# Patient Record
Sex: FEMALE | Race: WHITE | URBAN - METROPOLITAN AREA
[De-identification: names, ages, dates, MRNs, and addresses within clinical notes are randomized per-mention and may not be internally consistent; named-entity substitution may affect disease eponyms.]

---

## 2023-07-10 NOTE — PATIENT INSTRUCTIONS
Ob/gyn  - Dr. Kenji Storey - operates out of [facility] Dr. Adolfo Blackwood and Zena - operates out of [facility]

## 2023-07-24 NOTE — TELEPHONE ENCOUNTER
From: Jojo Velasquez  To: Sarath Beckman MD  Sent: 7/24/2023 9:34 AM CDT  Subject: Question regarding COMP METABOLIC PANEL (14)    Good Morning, can I get an explanation on what the results mean? One of them seem to be over the normal range/high, what does that mean and should I worry?

## 2024-05-02 ENCOUNTER — OFFICE VISIT (OUTPATIENT)
Dept: OBGYN CLINIC | Facility: CLINIC | Age: 39
End: 2024-05-02

## 2024-05-02 VITALS
SYSTOLIC BLOOD PRESSURE: 120 MMHG | BODY MASS INDEX: 30.1 KG/M2 | DIASTOLIC BLOOD PRESSURE: 72 MMHG | WEIGHT: 163.56 LBS | HEIGHT: 61.81 IN

## 2024-05-02 DIAGNOSIS — Z12.4 ENCOUNTER FOR PAPANICOLAOU SMEAR FOR CERVICAL CANCER SCREENING: ICD-10-CM

## 2024-05-02 DIAGNOSIS — Z30.432 ENCOUNTER FOR REMOVAL OF INTRAUTERINE CONTRACEPTIVE DEVICE: ICD-10-CM

## 2024-05-02 DIAGNOSIS — N76.0 VAGINITIS AND VULVOVAGINITIS: ICD-10-CM

## 2024-05-02 DIAGNOSIS — Z01.419 ENCOUNTER FOR WELL WOMAN EXAM WITH ROUTINE GYNECOLOGICAL EXAM: Primary | ICD-10-CM

## 2024-05-02 DIAGNOSIS — Z30.09 FAMILY PLANNING ADVICE: ICD-10-CM

## 2024-05-02 PROCEDURE — 99385 PREV VISIT NEW AGE 18-39: CPT | Performed by: OBSTETRICS & GYNECOLOGY

## 2024-05-02 PROCEDURE — 58301 REMOVE INTRAUTERINE DEVICE: CPT | Performed by: OBSTETRICS & GYNECOLOGY

## 2024-05-02 RX ORDER — NORGESTIMATE AND ETHINYL ESTRADIOL 0.25-0.035
1 KIT ORAL DAILY
Qty: 84 TABLET | Refills: 4 | Status: SHIPPED | OUTPATIENT
Start: 2024-05-02

## 2024-05-02 NOTE — PROGRESS NOTES
[facility]  Obstetrics and Gynecology  Gynecology Visit    Chief Complaint   Patient presents with    Annual           Patsy Poe is a 39 year old female who presents for annual exam.    LMP: IUD.    Menses regular: n/a.    Menstrual flow normal: n/a.    Birth control or HRT:  Mirena.     Last Pap Smear: 2022  Any history of abnormal paps: positive HPV, negative for 16,18,45.   Last MMG: n/a  Any Medication Refills needed today?: no  Sleep: 7-8 hours.    Diet: balanced.    Exercise: occasional.   Screening labs/Blood work today: no.     Colonoscopy (if over 44 y/o): n/a.   Gardasil:(age 9-44 y/o) n/a.   Genetic Cancer screen (if indicated): no.   Flu (Aug-April): n/a.TDAP (every 10 years) up to date.      Additional Problems/concerns: Patient would like IUD removed.      Next Appt: annual scheduled    Immunization History   Administered Date(s) Administered    TDAP 07/10/2023       No current outpatient medications on file.    No Known Allergies    OB History    Para Term  AB Living   2 2 2 0 0 2   SAB IAB Ectopic Multiple Live Births   0 0 0 0 2      # Outcome Date GA Lbr Yash/2nd Weight Sex Type Anes PTL Lv   2 Term 09     NORMAL SPONT   KENNEDY   1 Term 07     NORMAL SPONT   KENNEDY       No past medical history on file.    No past surgical history on file.    Family History   Problem Relation Age of Onset    Ovarian Cancer Maternal Grandmother     High Blood Pressure Father     Diabetes Father     High Cholesterol Father         Tobacco         Social History     Socioeconomic History    Marital status:      Spouse name: Not on file    Number of children: Not on file    Years of education: Not on file    Highest education level: Not on file   Occupational History    Not on file   Tobacco Use    Smoking status: Some Days     Passive exposure: Never    Smokeless tobacco: Never    Tobacco comments:     vaping   Vaping Use    Vaping status: Not on file   Substance and Sexual  Activity    Alcohol use: Yes     Comment: occ    Drug use: Never    Sexual activity: Yes     Birth control/protection: I.U.D.     Comment: in a relationship   Other Topics Concern    Not on file   Social History Narrative    Not on file     Social Determinants of Health     Financial Resource Strain: Not on file   Food Insecurity: Not on file   Transportation Needs: Not on file   Physical Activity: Not on file   Stress: Not on file   Social Connections: Not on file   Housing Stability: Not on file         /72   Ht 5' 1.81\" (1.57 m)   Wt 163 lb 9.3 oz (74.2 kg)   LMP 07/10/2019 (Exact Date)   BMI 30.10 kg/m²     Wt Readings from Last 3 Encounters:   05/02/24 163 lb 9.3 oz (74.2 kg)   07/10/23 162 lb 3.2 oz (73.6 kg)   03/01/22 158 lb (71.7 kg)         Health Maintenance   Topic Date Due    Influenza Vaccine (1) 08/01/2021    Screen for Cervical Cancer 11/05/2021    DTaP,Tdap and Td Vaccines (3 - Td or Tdap) 03/18/2025    Hepatitis C Screening Completed    HIV Screening Completed    COVID-19 Vaccine Completed     Review of Systems   General: Present- Feeling well. Not Present- Chills, Fever, Weight Gain and Weight Loss.  HEENT: Not Present- Headache and Sore Throat.  Respiratory: Not Present- Cough, Difficulty Breathing, Hemoptysis and Sputum Production.  Cardiovascular: Not Present- Chest Pain, Elevated Blood Pressure, Fainting / Blacking Out and Shortness of Breath.  Gastrointestinal: Not Present- Constipation, Diarrhea, Nausea and Vomiting.  Female Genitourinary: Not Present- Discharge, Dysuria and Frequency.  Musculoskeletal: Not Present- Leg Cramps and Swelling of Extremities.  Neurological: Not Present- Dizziness and Headaches.  Psychiatric: Not Present- Anxiety and Depression.  Endocrine: Not Present- Appetite Changes, Hair Changes and Thyroid Problems.  Hematology: Not Present- Easy Bruising and Excessive bleeding.  All other systems negative     Physical Exam The physical exam findings are as  follows:     General   Mental Status - Alert. General Appearance - Cooperative. Orientation - Oriented X4. Build & Nutrition - Well nourished.    Head and Neck  Thyroid   Gland Characteristics - normal size and consistency.    Chest and Lung Exam   Inspection:   Chest Wall: - Normal.  Percussion:   Quality and Intensity: - Percussion normal.  Palpation: - Palpation normal.  Auscultation:   Breath sounds: - Normal.  Adventitious sounds: - No Adventitious sounds.    Breast   Nipples: Characteristics - Bilateral - Normal. Discharge - Bilateral - None.  Breast - Bilateral - Symmetric.    Cardiovascular   Auscultation: Rhythm - Regular. Heart Sounds - Normal heart sounds.  Murmurs & Other Heart Sounds: Auscultation of the heart reveals - No Murmurs.    Abdomen   Inspection: Inspection of the abdomen reveals - No Hernias. Incisional scars - no incisional scars.  Palpation/Percussion: Palpation and Percussion of the abdomen reveal - Non Tender and No Palpable abdominal masses.  Liver: - Normal.  Auscultation: Auscultation of the abdomen reveals - Bowel sounds normal.    Female Genitourinary     External Genitalia   Perineum - Normal. Bartholin's Gland - Bilateral - Normal. Clitoris - Normal.  Introitus: Characteristics - No Cystocele, Enterocele or Rectocele. Discharge - None.  Labia Majora: Lesions - Bilateral - None. Characteristics - Bilateral - Normal.  Labia Minora: Lesions - Bilateral - None. Characteristics - Bilateral - Normal.  Urethra: Characteristics - Normal. Discharge - None.  [place] Gland - Bilateral - Normal.  Vulva: Characteristics - Normal. Lesions - None.    Speculum & Bimanual   Vagina:   Vaginal Wall: - Normal.  Vaginal Lesions - None. Vaginal Mucosa - Normal.  Cervix: Characteristics - No Motion tenderness. Discharge - None.  Uterus: Characteristics - Normal. Position - Midposition.  Adnexa: Characteristics - Bilateral - Normal. Masses - No Adnexal Masses.  Wet [place]: pH - 3.8-4.2. Vaginal discharge -  Clear  and Thin. Amine Odor - Absent. Main patient complaints - Discharge. Microscopy - Lactobacilli and Epithelial cells.    Rectal   Anorectal Exam: External - normal external exam.    Peripheral Vascular   Upper Extremity:   Palpation: - Pulses bilaterally normal.  Lower Extremity: Inspection - Bilateral - Inspection Normal.  Palpation: Edema - Bilateral - No edema.    Neurologic   Mental Status: - Normal.    Lymphatic  General Lymphatics   Description - Normal .     IUD removal   Risks, benefits, alternatives, and indications of IUD removal reviewed with patient.  The patient voices clear understanding and desires to proceed.  Consent for IUD removal was signed and witnessed by assistant.    Bimanual exam was performed, and the uterus was noted to be anteverted .A speculum was placed in the vagina.  The IUD strings and cervix were visualized.  The IUD strings were grasped with a ring forceps.  The IUD was then removed without difficulty.      The patient tolerated the procedure well.  There were no complications.  IUD information was given to the patient.          1. Encounter for well woman exam with routine gynecological exam    2. Encounter for Papanicolaou smear for cervical cancer screening    3. Encounter for removal of intrauterine contraceptive device  - REMOVE INTRAUTERINE DEVICE [[id number]]    4. Family planning advice

## 2024-05-03 LAB
BV BACTERIA DNA VAG QL NAA+PROBE: NEGATIVE
C GLABRATA DNA VAG QL NAA+PROBE: NEGATIVE
C KRUSEI DNA VAG QL NAA+PROBE: NEGATIVE
CANDIDA DNA VAG QL NAA+PROBE: NEGATIVE
T VAGINALIS DNA VAG QL NAA+PROBE: NEGATIVE

## 2024-05-07 LAB
.: NORMAL
.: NORMAL

## 2024-05-08 LAB — HPV I/H RISK 1 DNA SPEC QL NAA+PROBE: NEGATIVE

## 2024-05-28 ENCOUNTER — TELEPHONE (OUTPATIENT)
Dept: OBGYN CLINIC | Facility: CLINIC | Age: 39
End: 2024-05-28

## 2024-05-28 NOTE — TELEPHONE ENCOUNTER
Patient verified name and date of birth.    Patient would like to follow up in office to discuss Hemorrhoids. States she has been dealing with this issue since giving birth to her 2 kids. Patient has tried over the counter creams and suppositories without success. Patient scheduled with Dr. Farris 6/6. Aware of scheduling details.

## 2024-07-29 ENCOUNTER — HOSPITAL ENCOUNTER (OUTPATIENT)
Dept: ULTRASOUND IMAGING | Age: 39
Discharge: HOME | End: 2024-07-29
Attending: STUDENT IN AN ORGANIZED HEALTH CARE EDUCATION/TRAINING PROGRAM
Payer: COMMERCIAL

## 2024-07-29 DIAGNOSIS — R79.89 ABNORMAL LFTS: ICD-10-CM

## 2024-07-29 PROCEDURE — 76700 US EXAM ABDOM COMPLETE: CPT | Performed by: STUDENT IN AN ORGANIZED HEALTH CARE EDUCATION/TRAINING PROGRAM

## 2024-07-30 DIAGNOSIS — K76.0 FATTY LIVER: Primary | ICD-10-CM

## 2024-11-01 ENCOUNTER — OFFICE VISIT (OUTPATIENT)
Dept: OBGYN CLINIC | Facility: CLINIC | Age: 39
End: 2024-11-01
Payer: COMMERCIAL

## 2024-11-01 ENCOUNTER — LAB ENCOUNTER (OUTPATIENT)
Dept: LAB | Age: 39
End: 2024-11-01
Attending: STUDENT IN AN ORGANIZED HEALTH CARE EDUCATION/TRAINING PROGRAM
Payer: COMMERCIAL

## 2024-11-01 VITALS
SYSTOLIC BLOOD PRESSURE: 114 MMHG | DIASTOLIC BLOOD PRESSURE: 80 MMHG | WEIGHT: 167.81 LBS | HEIGHT: 61 IN | BODY MASS INDEX: 31.68 KG/M2

## 2024-11-01 DIAGNOSIS — K76.0 FATTY LIVER: Primary | ICD-10-CM

## 2024-11-01 DIAGNOSIS — N92.6 MISSED MENSES: Primary | ICD-10-CM

## 2024-11-01 DIAGNOSIS — Z13.0 SCREENING, IRON DEFICIENCY ANEMIA: ICD-10-CM

## 2024-11-01 LAB
B-HCG SERPL-ACNC: 15.6 MIU/ML
BASOPHILS # BLD AUTO: 0.05 X10(3) UL (ref 0–0.2)
BASOPHILS NFR BLD AUTO: 0.6 %
DEPRECATED HBV CORE AB SER IA-ACNC: 95 NG/ML
EOSINOPHIL # BLD AUTO: 0.15 X10(3) UL (ref 0–0.7)
EOSINOPHIL NFR BLD AUTO: 1.7 %
ERYTHROCYTE [DISTWIDTH] IN BLOOD BY AUTOMATED COUNT: 12.2 %
HAV AB SER QL IA: NONREACTIVE
HBV SURFACE AB SER QL: NONREACTIVE
HBV SURFACE AB SERPL IA-ACNC: 7.97 MIU/ML
HCT VFR BLD AUTO: 43.3 %
HCV AB SERPL QL IA: NONREACTIVE
HGB BLD-MCNC: 14.8 G/DL
IMM GRANULOCYTES # BLD AUTO: 0.02 X10(3) UL (ref 0–1)
IMM GRANULOCYTES NFR BLD: 0.2 %
LYMPHOCYTES # BLD AUTO: 2.67 X10(3) UL (ref 1–4)
LYMPHOCYTES NFR BLD AUTO: 29.6 %
MCH RBC QN AUTO: 30.5 PG (ref 26–34)
MCHC RBC AUTO-ENTMCNC: 34.2 G/DL (ref 31–37)
MCV RBC AUTO: 89.1 FL
MONOCYTES # BLD AUTO: 0.67 X10(3) UL (ref 0.1–1)
MONOCYTES NFR BLD AUTO: 7.4 %
NEUTROPHILS # BLD AUTO: 5.46 X10 (3) UL (ref 1.5–7.7)
NEUTROPHILS # BLD AUTO: 5.46 X10(3) UL (ref 1.5–7.7)
NEUTROPHILS NFR BLD AUTO: 60.5 %
PLATELET # BLD AUTO: 382 10(3)UL (ref 150–450)
PROGEST SERPL-MCNC: 0.43 NG/ML
RBC # BLD AUTO: 4.86 X10(6)UL
RH BLOOD TYPE: POSITIVE
WBC # BLD AUTO: 9 X10(3) UL (ref 4–11)

## 2024-11-01 PROCEDURE — 86900 BLOOD TYPING SEROLOGIC ABO: CPT

## 2024-11-01 PROCEDURE — 85025 COMPLETE CBC W/AUTO DIFF WBC: CPT | Performed by: OBSTETRICS & GYNECOLOGY

## 2024-11-01 PROCEDURE — 86803 HEPATITIS C AB TEST: CPT | Performed by: STUDENT IN AN ORGANIZED HEALTH CARE EDUCATION/TRAINING PROGRAM

## 2024-11-01 PROCEDURE — 87517 HEPATITIS B DNA QUANT: CPT

## 2024-11-01 PROCEDURE — 86901 BLOOD TYPING SEROLOGIC RH(D): CPT

## 2024-11-01 PROCEDURE — 36415 COLL VENOUS BLD VENIPUNCTURE: CPT | Performed by: STUDENT IN AN ORGANIZED HEALTH CARE EDUCATION/TRAINING PROGRAM

## 2024-11-01 PROCEDURE — 82728 ASSAY OF FERRITIN: CPT | Performed by: STUDENT IN AN ORGANIZED HEALTH CARE EDUCATION/TRAINING PROGRAM

## 2024-11-01 PROCEDURE — 84702 CHORIONIC GONADOTROPIN TEST: CPT | Performed by: OBSTETRICS & GYNECOLOGY

## 2024-11-01 PROCEDURE — 84144 ASSAY OF PROGESTERONE: CPT | Performed by: STUDENT IN AN ORGANIZED HEALTH CARE EDUCATION/TRAINING PROGRAM

## 2024-11-01 PROCEDURE — 86706 HEP B SURFACE ANTIBODY: CPT | Performed by: STUDENT IN AN ORGANIZED HEALTH CARE EDUCATION/TRAINING PROGRAM

## 2024-11-01 PROCEDURE — 86708 HEPATITIS A ANTIBODY: CPT | Performed by: STUDENT IN AN ORGANIZED HEALTH CARE EDUCATION/TRAINING PROGRAM

## 2024-11-01 NOTE — PROGRESS NOTES
Chief Complaint   Patient presents with    Gyn Exam         Patsy Singh is a 39 year old female who presents for questionable pregnancy -  had +upt and then had some heavy bleeding unsure about pregnancy .    LMP: 6weeks ago .    Menses regular: yes .    Menstrual flow normal: yes.      Immunization History   Administered Date(s) Administered    TDAP 07/10/2023       No current outpatient medications on file.    Allergies[1]    OB History    Para Term  AB Living   3 2 2 0 0 2   SAB IAB Ectopic Multiple Live Births   0 0 0 0 2      # Outcome Date GA Lbr Yash/2nd Weight Sex Type Anes PTL Lv   3 Current            2 Term 09    F Vag-Spont   KENNEDY   1 Term 07    F Vag-Spont   KENNEDY       History reviewed. No pertinent past medical history.    History reviewed. No pertinent surgical history.    Family History   Problem Relation Age of Onset    Ovarian Cancer Maternal Grandmother     High Blood Pressure Father     Diabetes Father     High Cholesterol Father         Tobacco  Allergies  Meds  Med Hx  Surg Hx  Fam Hx  Soc Hx        Social History     Socioeconomic History    Marital status:      Spouse name: Not on file    Number of children: Not on file    Years of education: Not on file    Highest education level: Not on file   Occupational History    Not on file   Tobacco Use    Smoking status: Some Days     Passive exposure: Never    Smokeless tobacco: Never    Tobacco comments:     vaping   Vaping Use    Vaping status: Every Day    Substances: Nicotine    Devices: Refillable tank   Substance and Sexual Activity    Alcohol use: Yes     Comment: occ    Drug use: Never    Sexual activity: Yes     Birth control/protection: I.U.D.     Comment: in a relationship   Other Topics Concern    Not on file   Social History Narrative    Not on file     Social Drivers of Health     Financial Resource Strain: Not on file   Food Insecurity: Not on file   Transportation Needs: Not on file   Stress:  Not on file   Housing Stability: Not on file       /80   Ht 5' 1\" (1.549 m)   Wt 167 lb 12.8 oz (76.1 kg)   LMP 09/27/2024     Wt Readings from Last 3 Encounters:   11/01/24 167 lb 12.8 oz (76.1 kg)   05/02/24 163 lb 9.3 oz (74.2 kg)   07/10/23 162 lb 3.2 oz (73.6 kg)       Health Maintenance   Topic Date Due    Tobacco Cessation Counseling  Never done    Annual Physical  07/10/2024    COVID-19 Vaccine (1 - 2023-24 season) Never done    Influenza Vaccine (1) Never done    Pap Smear  05/02/2027    DTaP,Tdap,and Td Vaccines (2 - Td or Tdap) 07/10/2033    Annual Depression Screening  Completed    Pneumococcal Vaccine: Birth to 64yrs  Aged Out         Review of Systems   General: Present- Feeling well. Not Present- Fever.  Female Genitourinary: Not Present- Dysmenorrhea, Dyspareunia, Flank Pain, Frequency, Menstrual Irregularities, Pelvic Pain, Urgency, Urinary Complaints, Vaginal Bleeding and Vaginal dryness.  Pain: Present- Pain Rating - 0 on a 0-10 scale.  All other systems negative       Physical Exam   The physical exam findings are as follows:     Abdomen   Inspection: - Inspection Normal.  Palpation/Percussion: Palpation and Percussion of the abdomen reveal - Non Tender, No hepatosplenomegaly and No Palpable abdominal masses.    Female Genitourinary     External Genitalia   Perineum - Normal. Bartholin's Gland - Bilateral - Normal. Clitoris - Normal.  Introitus: Characteristics - Normal. Discharge - None.  Labia Majora: Lesions - Bilateral - None. Characteristics - Bilateral - Normal.  Labia Minora: Lesions - Bilateral - None. Characteristics - Bilateral - Normal.  Urethra: Characteristics - Normal. Discharge - None.  [place] Gland - Bilateral - Normal.  Vulva: Characteristics - Normal. Lesions - None.    Speculum & Bimanual   Vagina:   Vaginal Wall: - Normal.  Vaginal Lesions - None. Vaginal Mucosa - Normal.  Cervix: Characteristics - No Motion tenderness. Discharge - None.  Uterus: Characteristics - Non  Tender. Position - Midposition.  Adnexa: Characteristics - Bilateral - Tender. Masses - No Adnexal Masses.  Bladder - Normal.    Rectal   Anorectal Exam: External - normal external exam.    Lymphatic  General Lymphatics   Description - Normal .      Location: Transabdominal  Transvaginal x    OB Data: Fetus/Gestational Sac# 1/1      Placenta Location/Grade       EGA Dates  6 wks       U/S EGA nothing seen on ultrasound         Impression:Early pregnancy vs SAB correlate with labs and will follow-up with patient    SAB and ectopic precautions given.     Patient to continue her PNV at this time.     1. Missed menses    2. Screening, iron deficiency anemia                     [1] No Known Allergies

## 2024-11-04 ENCOUNTER — PATIENT MESSAGE (OUTPATIENT)
Dept: FAMILY MEDICINE CLINIC | Facility: CLINIC | Age: 39
End: 2024-11-04

## 2024-11-04 LAB
HBV DNA DETECT, QN LOG: NOT DETECTED {LOG_IU}/ML
HBV DNA DETECT, QN: NOT DETECTED [IU]/ML

## 2024-11-06 DIAGNOSIS — N92.6 MISSED MENSES: Primary | ICD-10-CM

## 2024-11-11 ENCOUNTER — EKG ENCOUNTER (OUTPATIENT)
Dept: LAB | Age: 39
End: 2024-11-11
Attending: OBSTETRICS & GYNECOLOGY
Payer: COMMERCIAL

## 2024-11-11 DIAGNOSIS — N92.6 MISSED MENSES: ICD-10-CM

## 2024-11-11 LAB — B-HCG SERPL-ACNC: 7.6 MIU/ML

## 2024-11-11 PROCEDURE — 84702 CHORIONIC GONADOTROPIN TEST: CPT

## 2024-11-11 PROCEDURE — 36415 COLL VENOUS BLD VENIPUNCTURE: CPT

## 2024-11-18 ENCOUNTER — LAB ENCOUNTER (OUTPATIENT)
Dept: LAB | Age: 39
End: 2024-11-18
Attending: OBSTETRICS & GYNECOLOGY
Payer: COMMERCIAL

## 2024-11-18 DIAGNOSIS — N92.6 MISSED MENSES: ICD-10-CM

## 2024-11-18 LAB — B-HCG SERPL-ACNC: <2.6 MIU/ML

## 2024-11-18 PROCEDURE — 36415 COLL VENOUS BLD VENIPUNCTURE: CPT

## 2024-11-18 PROCEDURE — 84702 CHORIONIC GONADOTROPIN TEST: CPT

## 2024-12-23 ENCOUNTER — LAB ENCOUNTER (OUTPATIENT)
Dept: LAB | Age: 39
End: 2024-12-23
Attending: OBSTETRICS & GYNECOLOGY
Payer: COMMERCIAL

## 2024-12-23 DIAGNOSIS — N92.6 MISSED MENSES: ICD-10-CM

## 2024-12-23 LAB — B-HCG SERPL-ACNC: 4.1 MIU/ML

## 2024-12-23 PROCEDURE — 84702 CHORIONIC GONADOTROPIN TEST: CPT

## 2024-12-23 PROCEDURE — 36415 COLL VENOUS BLD VENIPUNCTURE: CPT

## 2025-01-06 ENCOUNTER — OFFICE VISIT (OUTPATIENT)
Dept: INTERNAL MEDICINE CLINIC | Facility: CLINIC | Age: 40
End: 2025-01-06
Payer: COMMERCIAL

## 2025-01-06 VITALS
BODY MASS INDEX: 32.39 KG/M2 | DIASTOLIC BLOOD PRESSURE: 60 MMHG | SYSTOLIC BLOOD PRESSURE: 110 MMHG | HEART RATE: 72 BPM | RESPIRATION RATE: 16 BRPM | HEIGHT: 62 IN | WEIGHT: 176 LBS | TEMPERATURE: 98 F

## 2025-01-06 DIAGNOSIS — Z13.0 SCREENING FOR ENDOCRINE, METABOLIC AND IMMUNITY DISORDER: ICD-10-CM

## 2025-01-06 DIAGNOSIS — E66.09 CLASS 1 OBESITY DUE TO EXCESS CALORIES WITHOUT SERIOUS COMORBIDITY WITH BODY MASS INDEX (BMI) OF 32.0 TO 32.9 IN ADULT: ICD-10-CM

## 2025-01-06 DIAGNOSIS — Z00.00 PHYSICAL EXAM, ANNUAL: Primary | ICD-10-CM

## 2025-01-06 DIAGNOSIS — Z72.0 VAPES NICOTINE CONTAINING SUBSTANCE: ICD-10-CM

## 2025-01-06 DIAGNOSIS — Z13.0 SCREENING FOR DEFICIENCY ANEMIA: ICD-10-CM

## 2025-01-06 DIAGNOSIS — Z13.29 SCREENING FOR ENDOCRINE, METABOLIC AND IMMUNITY DISORDER: ICD-10-CM

## 2025-01-06 DIAGNOSIS — Z31.69 INFERTILITY COUNSELING: ICD-10-CM

## 2025-01-06 DIAGNOSIS — Z71.6 ENCOUNTER FOR TOBACCO USE CESSATION COUNSELING: ICD-10-CM

## 2025-01-06 DIAGNOSIS — Z13.220 SCREENING FOR LIPID DISORDERS: ICD-10-CM

## 2025-01-06 DIAGNOSIS — K76.0 FATTY LIVER DISEASE, NONALCOHOLIC: ICD-10-CM

## 2025-01-06 DIAGNOSIS — Z13.228 SCREENING FOR ENDOCRINE, METABOLIC AND IMMUNITY DISORDER: ICD-10-CM

## 2025-01-06 DIAGNOSIS — E66.811 CLASS 1 OBESITY DUE TO EXCESS CALORIES WITHOUT SERIOUS COMORBIDITY WITH BODY MASS INDEX (BMI) OF 32.0 TO 32.9 IN ADULT: ICD-10-CM

## 2025-01-06 RX ORDER — MULTIVIT,IRON,MINERALS/LUTEIN
TABLET ORAL DAILY
COMMUNITY

## 2025-01-06 RX ORDER — NICOTINE 21 MG/24HR
1 PATCH, TRANSDERMAL 24 HOURS TRANSDERMAL EVERY 24 HOURS
Qty: 30 EACH | Refills: 0 | Status: SHIPPED | OUTPATIENT
Start: 2025-01-06

## 2025-01-06 NOTE — PROGRESS NOTES
CHIEF COMPLAINT:    Chief Complaint   Patient presents with    Fertility     Trying to get pregnant.  age 17 and 19.  2nd marriage, he wants kids  Had IUD removed in May, and stopped OC in August    Wrist Pain     Comes and goes, but when present cannot lift wrist-R. Problem because she is right-handed-now x ??1 year         HPI, Assessment & Plan:    The patient is a 39 year old female with hx of obesity, fatty liver disease who presents for physical exam and fertility issues.    //Infertility   //Miscarriages  IUD x 5 years, removed in May. Stopped OC in August. One month prior to that she had nonviable pregnancy - 3 days late - one day of spotting. Last period was 5 days late. Pregnancy test was positive at home. Blood test was negative. Has 2 children. Getting pregnant was much easier. Vape, trying to quit. Does not exercise because of being really busy. She is a  in [place]. Gets home at 7pm and hard to fit in exercise. Diet is not that great, just got officially  this summer. On a low carb no sugar diet. Last period was 5 days late after the positive pregnancy test.  has not had fertility testing.   PLAN:   Likely has had two miscarriages in the last 5 months. Pap smear UTD.   -See gyn for further fertility treatment.   - should see urology for fertility testing.   -Lab work-up to evaluate for metabolic causes of infertility.   -Recommend lifestyle changes such as exercise and dietary management.     //Obesity  Does note exercise. Just started a new diet. Feels it is a lifestyle related weight gain since she got . Does wear a garmin which monitors her steps.   PLAN:   -Attempt 66207 steps per day.   -Exercise and diet regularly.     //R Wrist pain likely tendonitis vs. Carpel tunnel syndrome  Comes and goes. Bad enough that she can't lift anything. Driving a lot and typing. On going for a  year. Not currently having it. Does note on the dorsum of her R hand in the  pinky, ring, and middle finger having numbness.   PLAN:   Phallen's positive, tinnel's negative. Does report some pain in the R wrist with finklestein's. Recommend wrist brace nightly. If no improvement, then order OT.     //KATIE  Had elevated ALT last year. Liver ultrasound done and demonstrated increased echogenecity likely consistent with mild fatty infiltration.   PLAN:   Hepatitis B, C, and A negative.   -Repeat LFTs. If still elevated, work-up with full MASLD work-up and obtain ultrasound liver with elastography.   -Discussed lifestyle changes with diet and exercise.     //Current vaper  Reports vaping in the car and at home. Has not tried to quit yet.   PLAN:   -Nicotine patch to help with cravings.   -Nicotine lozenges and gum PRN.   -Discussed lifestyle changes to help replace habit of smoking.       2 teenage children     Does not exercise  Recently start keto diet    HEALTH MAINTENANCE:   -Vaccinations: Prevnar not indicated, Shingrix not indicated, Flu due refused, COVID due refused  -Colonoscopy: - No family history of colon cancer.   -Mammogram: - No family history of breast cancer. Grandmother  of ovarian cancer.   -Pap Smear: 2024  -Diabetes screening: Last A1c value was 5.2% done 3/5/2022.  -Lipid screening: Cholesterol: 188, done on 2023.  HDL Cholesterol: 53, done on 2023.  TriGlycerides 93, done on 2023.  LDL Cholesterol: 116, done on 2023.   -Birth Control: Not on birth control  -Smoking: Vapes daily at home and in car rides to and from work  -Alcohol: Minimal socially  -Marijuana: denies, former  -Recreational drug: denies    Return in about 1 year (around 2026) for Physical exam .    Judy Mascorro MD  Internal Medicine     Past Medical History:   No past medical history on file.     Past Surgical History:   No past surgical history on file.    Current Medications:    Current Outpatient Medications   Medication Sig Dispense Refill     Prenatal Vit-Fe Fumarate-FA (MULTI PRENATAL) 27-0.8 MG Oral Tab Take by mouth daily.      nicotine 14 MG/24HR Transdermal Patch 24 Hr Place 1 patch onto the skin daily. 30 each 0         Allergies:    Allergies as of 01/06/2025    (No Known Allergies)       SOCIAL HISTORY:    Social History     Socioeconomic History    Marital status:      Spouse name: Not on file    Number of children: Not on file    Years of education: Not on file    Highest education level: Not on file   Occupational History    Not on file   Tobacco Use    Smoking status: Some Days     Passive exposure: Never    Smokeless tobacco: Never    Tobacco comments:     vaping   Vaping Use    Vaping status: Every Day    Substances: Nicotine    Devices: Refillable tank   Substance and Sexual Activity    Alcohol use: Yes     Comment: rare    Drug use: Never    Sexual activity: Yes     Birth control/protection: I.U.D.     Comment: in a relationship   Other Topics Concern    Not on file   Social History Narrative    Not on file     Social Drivers of Health     Financial Resource Strain: Not on file   Food Insecurity: Not on file   Transportation Needs: Not on file   Stress: Not on file   Housing Stability: Not on file       FAMILY HISTORY:   Family History   Problem Relation Age of Onset    Ovarian Cancer Maternal Grandmother     High Blood Pressure Father     Diabetes Father     High Cholesterol Father        REVIEW OF SYSTEMS:  Negative except for what is mentioned in HPI.     DEPRESSION ASSESSMENT:  1. Little interest or pleasure in doing things: Not at all  2. Feeling down, depressed, or hopeless: Not at all  3.    4.    5.    6.    7.    8.    9.               PHYSICAL EXAM:   /60 (BP Location: Right arm, Patient Position: Sitting, Cuff Size: adult)   Pulse 72   Temp 97.8 °F (36.6 °C) (Skin)   Resp 16   Ht 5' 2\" (1.575 m)   Wt 176 lb (79.8 kg)   LMP 12/23/2024   BMI 32.19 kg/m²  Body mass index is 32.19 kg/m².   General: No acute  distress. Alert and oriented x 3.  HEENT: Ear canals clear. TMs pearly gray bilaterally. Throat without erythema or exudates.  Neck: No lymphadenopathy.  No thyromegaly.   Respiratory: Clear to auscultation bilaterally.  No wheezes, rales, or rhonchi.   Cardiovascular: RRR. No murmurs, rubs, or gallops.   Abdomen: Soft, nontender, nondistended. Normoactive bowel sounds. No rebound tenderness  Neurologic: PERRLA. EOM intact. CN 2-12 intact. Strength 5/5 bilaterally in upper and lower extremities.   Musculoskeletal: Back is non-tender. FROM of back. No lordosis. No joint deformities or tenderness.  Psychiatric: Appropriate mood and affect.  BREAST: no dominant or suspicious mass, no nipple discharge    LABS:   Reviewed.     IMAGING:   Reviewed.

## 2025-01-06 NOTE — PATIENT INSTRUCTIONS
-Recommend fertility testing with urology for  - Dr. Villatoro or Dr. Barrera.   -Recommend Gyn evaluation for you to discuss fertility stimulating treatments such as oral medication or vaginal medication.   -Recommend lifestyle changes to help with weight loss, fatty liver disease, and also fertility including stop vaping, exercise, and dietary changes as you are.   -Lab work-up to check for thyroid dysfunction, anemia, liver function, kidney function, cholesterol, and diabetes.   -Wrist brace for wrist pain. If not better, than call for occupational therapy referral for treatment of tendonitis.   -Nicotine patches ordered for vaping cessation. Once pregnant, you should stop using nicotine patches and vaping all together.

## 2025-02-14 ENCOUNTER — TELEPHONE (OUTPATIENT)
Dept: INTERNAL MEDICINE CLINIC | Facility: CLINIC | Age: 40
End: 2025-02-14

## 2025-02-14 NOTE — TELEPHONE ENCOUNTER
Appointment for: Patsy Singh ([id number])  Visit type: MYCHART EXAM (2964)  2/17/2025 12:20 PM (20 minutes) with Judy Mascorro in [facility]     Patient comments:  I’ve had issues in the past with a bulging disc in my shoulder and the pain is unbearable right now.

## 2025-02-17 ENCOUNTER — OFFICE VISIT (OUTPATIENT)
Dept: INTERNAL MEDICINE CLINIC | Facility: CLINIC | Age: 40
End: 2025-02-17
Payer: COMMERCIAL

## 2025-02-17 VITALS
WEIGHT: 173 LBS | HEART RATE: 90 BPM | OXYGEN SATURATION: 97 % | HEIGHT: 62 IN | BODY MASS INDEX: 31.83 KG/M2 | SYSTOLIC BLOOD PRESSURE: 110 MMHG | RESPIRATION RATE: 21 BRPM | DIASTOLIC BLOOD PRESSURE: 60 MMHG | TEMPERATURE: 98 F

## 2025-02-17 DIAGNOSIS — M54.12 CERVICAL RADICULOPATHY: Primary | ICD-10-CM

## 2025-02-17 PROCEDURE — 99213 OFFICE O/P EST LOW 20 MIN: CPT | Performed by: STUDENT IN AN ORGANIZED HEALTH CARE EDUCATION/TRAINING PROGRAM

## 2025-02-17 NOTE — PROGRESS NOTES
CHIEF COMPLAINT:    Chief Complaint   Patient presents with    Shoulder Pain     Ongoing pain in the left shoulder has history of bulging disc in the past. Pain level is unbearable 8/10 hard to do ROM, pain Travel down left arm .         HPI, Assessment & Plan:    The patient is a 40 year old female with hx of obesity, fatty liver disease who presents for L shoulder issues.    //L shoulder pain c/b cervical radiculopathy   About 2 years ago, may be a bit longer. Had a neck pain where she could not turn her head. Went to chiropractor which made it worse for several months. Went to doctor who diagnosed with bulging disc and went to PT. PT helped, but due to work and odd positioning she feels stiffness in the neck and shoulder with numbness in the arm. This weekend she could not lift her arm up either. Feels like its a bit better. Did not get imaging at the time. Has not continued to do PT. She would like a work note for a standing desk.   PLAN:   Exam and findings concerning for cervical radiculopathy. Isolated shoulder arthritis less likely, but may also be a component of symptoms.   -PT referral placed.   -Work note given for accomodation.   -MRI cervical spine to evaluate for disc pathology.     PROBLEMS NOT DISCUSSED TODAY:   //Infertility   //Miscarriages  IUD x 5 years, removed in May. Stopped OC in August. One month prior to that she had nonviable pregnancy - 3 days late - one day of spotting. Last period was 5 days late. Pregnancy test was positive at home. Blood test was negative. Has 2 children. Getting pregnant was much easier. Vape, trying to quit. Does not exercise because of being really busy. She is a  in [place]. Gets home at 7pm and hard to fit in exercise. Diet is not that great, just got officially  this summer. On a low carb no sugar diet. Last period was 5 days late after the positive pregnancy test.  has not had fertility testing.   PLAN:   Likely has had two  miscarriages in the last 5 months. Pap smear UTD.   -See gyn for further fertility treatment.   - should see urology for fertility testing.   -Lab work-up to evaluate for metabolic causes of infertility.   -Recommend lifestyle changes such as exercise and dietary management.     //Obesity  Does note exercise. Just started a new diet. Feels it is a lifestyle related weight gain since she got . Does wear a garmin which monitors her steps.   PLAN:   -Attempt 93861 steps per day.   -Exercise and diet regularly.     //R Wrist pain likely tendonitis vs. Carpel tunnel syndrome  Comes and goes. Bad enough that she can't lift anything. Driving a lot and typing. On going for a  year. Not currently having it. Does note on the dorsum of her R hand in the pinky, ring, and middle finger having numbness.   PLAN:   Phallen's positive, tinnel's negative. Does report some pain in the R wrist with finklestein's. Recommend wrist brace nightly. If no improvement, then order OT.     //KATIE  Had elevated ALT last year. Liver ultrasound done and demonstrated increased echogenecity likely consistent with mild fatty infiltration.   PLAN:   Hepatitis B, C, and A negative.   -Repeat LFTs. If still elevated, work-up with full MASLD work-up and obtain ultrasound liver with elastography.   -Discussed lifestyle changes with diet and exercise.     //Current vaper  Reports vaping in the car and at home. Has not tried to quit yet.   PLAN:   -Nicotine patch to help with cravings.   -Nicotine lozenges and gum PRN.   -Discussed lifestyle changes to help replace habit of smoking.       2 teenage children     Does not exercise  Recently start keto diet    HEALTH MAINTENANCE:   -Vaccinations: Prevnar not indicated, Shingrix not indicated, Flu due refused, COVID due refused  -Colonoscopy: - No family history of colon cancer.   -Mammogram: - No family history of breast cancer. Grandmother  of ovarian cancer.   -Pap  Smear: 05/02/2024  -Diabetes screening: Last A1c value was 5.2% done 3/5/2022.  -Lipid screening: Cholesterol: 188, done on 7/20/2023.  HDL Cholesterol: 53, done on 7/20/2023.  TriGlycerides 93, done on 7/20/2023.  LDL Cholesterol: 116, done on 7/20/2023.   -Birth Control: Not on birth control  -Smoking: Vapes daily at home and in car rides to and from work  -Alcohol: Minimal socially  -Marijuana: denies, former  -Recreational drug: denies    Return if symptoms worsen or fail to improve.    Judy Mascorro MD  Internal Medicine     Past Medical History:   No past medical history on file.     Past Surgical History:   No past surgical history on file.    Current Medications:    Current Outpatient Medications   Medication Sig Dispense Refill    Prenatal Vit-Fe Fumarate-FA (MULTI PRENATAL) 27-0.8 MG Oral Tab Take by mouth daily.      nicotine 14 MG/24HR Transdermal Patch 24 Hr Place 1 patch onto the skin daily. 30 each 0         Allergies:    Allergies as of 02/17/2025    (No Known Allergies)       SOCIAL HISTORY:    Social History     Socioeconomic History    Marital status:      Spouse name: Not on file    Number of children: Not on file    Years of education: Not on file    Highest education level: Not on file   Occupational History    Not on file   Tobacco Use    Smoking status: Some Days     Passive exposure: Never    Smokeless tobacco: Never    Tobacco comments:     vaping   Vaping Use    Vaping status: Every Day    Substances: Nicotine    Devices: Refillable tank   Substance and Sexual Activity    Alcohol use: Yes     Comment: rare    Drug use: Never    Sexual activity: Yes     Birth control/protection: I.U.D.     Comment: in a relationship   Other Topics Concern    Not on file   Social History Narrative    Not on file     Social Drivers of Health     Food Insecurity: Not on file   Transportation Needs: Not on file   Stress: Not on file   Housing Stability: Not on file       FAMILY HISTORY:   Family History    Problem Relation Age of Onset    Ovarian Cancer Maternal Grandmother     High Blood Pressure Father     Diabetes Father     High Cholesterol Father        REVIEW OF SYSTEMS:  Negative except for what is mentioned in HPI.    PHYSICAL EXAM:   /60 (BP Location: Right arm, Patient Position: Sitting, Cuff Size: adult)   Pulse 90   Temp 97.8 °F (36.6 °C) (Temporal)   Resp 21   Ht 5' 2\" (1.575 m)   Wt 173 lb (78.5 kg)   LMP 02/13/2025   SpO2 97%   Breastfeeding Unknown   BMI 31.64 kg/m²  Body mass index is 31.64 kg/m².   General: No acute distress. Alert and oriented x 3.  Musculoskeletal: Back is non-tender. FROM of back. No lordosis. No joint deformities or tenderness. No tenderness along cervical spine. Tenderness to resistance with shoulder flexion/extension, abduction/adduction. Noting some numbness in the arm with resistance. No weakness elicited.     LABS:   Reviewed.     IMAGING:   Reviewed.

## 2025-02-17 NOTE — PATIENT INSTRUCTIONS
Labs due - quest. Fasting.     MRI ordered - check with insurance    PT ordered, call to schedule.    alert and awake/follows commands

## 2025-03-17 ENCOUNTER — LAB ENCOUNTER (OUTPATIENT)
Dept: LAB | Age: 40
End: 2025-03-17
Attending: STUDENT IN AN ORGANIZED HEALTH CARE EDUCATION/TRAINING PROGRAM
Payer: COMMERCIAL

## 2025-03-17 LAB
ALBUMIN SERPL-MCNC: 4.9 G/DL (ref 3.2–4.8)
ALBUMIN/GLOB SERPL: 1.8 {RATIO} (ref 1–2)
ALP LIVER SERPL-CCNC: 47 U/L
ALT SERPL-CCNC: 47 U/L
ANION GAP SERPL CALC-SCNC: 8 MMOL/L (ref 0–18)
AST SERPL-CCNC: 19 U/L (ref ?–34)
BASOPHILS # BLD AUTO: 0.05 X10(3) UL (ref 0–0.2)
BASOPHILS NFR BLD AUTO: 0.7 %
BILIRUB SERPL-MCNC: 0.5 MG/DL (ref 0.3–1.2)
BUN BLD-MCNC: 14 MG/DL (ref 9–23)
CALCIUM BLD-MCNC: 9.8 MG/DL (ref 8.7–10.6)
CHLORIDE SERPL-SCNC: 103 MMOL/L (ref 98–112)
CHOLEST SERPL-MCNC: 190 MG/DL (ref ?–200)
CO2 SERPL-SCNC: 29 MMOL/L (ref 21–32)
CREAT BLD-MCNC: 0.82 MG/DL
EGFRCR SERPLBLD CKD-EPI 2021: 94 ML/MIN/1.73M2 (ref 60–?)
EOSINOPHIL # BLD AUTO: 0.11 X10(3) UL (ref 0–0.7)
EOSINOPHIL NFR BLD AUTO: 1.5 %
ERYTHROCYTE [DISTWIDTH] IN BLOOD BY AUTOMATED COUNT: 12 %
EST. AVERAGE GLUCOSE BLD GHB EST-MCNC: 111 MG/DL (ref 68–126)
FASTING PATIENT LIPID ANSWER: YES
FASTING STATUS PATIENT QL REPORTED: YES
GLOBULIN PLAS-MCNC: 2.8 G/DL (ref 2–3.5)
GLUCOSE BLD-MCNC: 93 MG/DL (ref 70–99)
HBA1C MFR BLD: 5.5 % (ref ?–5.7)
HCT VFR BLD AUTO: 42.8 %
HDLC SERPL-MCNC: 52 MG/DL (ref 40–59)
HGB BLD-MCNC: 14.4 G/DL
IMM GRANULOCYTES # BLD AUTO: 0.02 X10(3) UL (ref 0–1)
IMM GRANULOCYTES NFR BLD: 0.3 %
LDLC SERPL CALC-MCNC: 122 MG/DL (ref ?–100)
LYMPHOCYTES # BLD AUTO: 1.97 X10(3) UL (ref 1–4)
LYMPHOCYTES NFR BLD AUTO: 27.2 %
MCH RBC QN AUTO: 29.5 PG (ref 26–34)
MCHC RBC AUTO-ENTMCNC: 33.6 G/DL (ref 31–37)
MCV RBC AUTO: 87.7 FL
MONOCYTES # BLD AUTO: 0.61 X10(3) UL (ref 0.1–1)
MONOCYTES NFR BLD AUTO: 8.4 %
NEUTROPHILS # BLD AUTO: 4.47 X10 (3) UL (ref 1.5–7.7)
NEUTROPHILS # BLD AUTO: 4.47 X10(3) UL (ref 1.5–7.7)
NEUTROPHILS NFR BLD AUTO: 61.9 %
NONHDLC SERPL-MCNC: 138 MG/DL (ref ?–130)
OSMOLALITY SERPL CALC.SUM OF ELEC: 290 MOSM/KG (ref 275–295)
PLATELET # BLD AUTO: 368 10(3)UL (ref 150–450)
POTASSIUM SERPL-SCNC: 4.5 MMOL/L (ref 3.5–5.1)
PROT SERPL-MCNC: 7.7 G/DL (ref 5.7–8.2)
RBC # BLD AUTO: 4.88 X10(6)UL
SODIUM SERPL-SCNC: 140 MMOL/L (ref 136–145)
TRIGL SERPL-MCNC: 90 MG/DL (ref 30–149)
TSI SER-ACNC: 1.52 UIU/ML (ref 0.55–4.78)
VLDLC SERPL CALC-MCNC: 16 MG/DL (ref 0–30)
WBC # BLD AUTO: 7.2 X10(3) UL (ref 4–11)

## 2025-03-17 PROCEDURE — 80061 LIPID PANEL: CPT | Performed by: STUDENT IN AN ORGANIZED HEALTH CARE EDUCATION/TRAINING PROGRAM

## 2025-03-17 PROCEDURE — 83036 HEMOGLOBIN GLYCOSYLATED A1C: CPT | Performed by: STUDENT IN AN ORGANIZED HEALTH CARE EDUCATION/TRAINING PROGRAM

## 2025-03-17 PROCEDURE — 85025 COMPLETE CBC W/AUTO DIFF WBC: CPT | Performed by: STUDENT IN AN ORGANIZED HEALTH CARE EDUCATION/TRAINING PROGRAM

## 2025-03-17 PROCEDURE — 84443 ASSAY THYROID STIM HORMONE: CPT | Performed by: STUDENT IN AN ORGANIZED HEALTH CARE EDUCATION/TRAINING PROGRAM

## 2025-03-17 PROCEDURE — 80053 COMPREHEN METABOLIC PANEL: CPT | Performed by: STUDENT IN AN ORGANIZED HEALTH CARE EDUCATION/TRAINING PROGRAM

## 2025-03-17 PROCEDURE — 36415 COLL VENOUS BLD VENIPUNCTURE: CPT | Performed by: STUDENT IN AN ORGANIZED HEALTH CARE EDUCATION/TRAINING PROGRAM

## 2025-04-25 ENCOUNTER — OFFICE VISIT (OUTPATIENT)
Dept: OBGYN CLINIC | Facility: CLINIC | Age: 40
End: 2025-04-25
Payer: COMMERCIAL

## 2025-04-25 ENCOUNTER — LAB ENCOUNTER (OUTPATIENT)
Dept: LAB | Age: 40
End: 2025-04-25
Attending: OBSTETRICS & GYNECOLOGY
Payer: COMMERCIAL

## 2025-04-25 VITALS
WEIGHT: 176 LBS | HEART RATE: 90 BPM | DIASTOLIC BLOOD PRESSURE: 79 MMHG | SYSTOLIC BLOOD PRESSURE: 115 MMHG | BODY MASS INDEX: 32.39 KG/M2 | HEIGHT: 62 IN

## 2025-04-25 DIAGNOSIS — N92.6 MISSED MENSES: Primary | ICD-10-CM

## 2025-04-25 DIAGNOSIS — N92.6 MISSED MENSES: ICD-10-CM

## 2025-04-25 DIAGNOSIS — Z87.59 HISTORY OF 2 SPONTANEOUS ABORTIONS: ICD-10-CM

## 2025-04-25 LAB
B-HCG SERPL-ACNC: ABNORMAL MIU/ML (ref ?–4.2)
CONTROL LINE PRESENT WITH A CLEAR BACKGROUND (YES/NO): YES YES/NO
KIT LOT #: NORMAL NUMERIC
PREGNANCY TEST, URINE: POSITIVE
PROGEST SERPL-MCNC: 6.14 NG/ML

## 2025-04-25 PROCEDURE — 36415 COLL VENOUS BLD VENIPUNCTURE: CPT

## 2025-04-25 PROCEDURE — 84144 ASSAY OF PROGESTERONE: CPT

## 2025-04-25 PROCEDURE — 84702 CHORIONIC GONADOTROPIN TEST: CPT

## 2025-04-25 NOTE — PROGRESS NOTES
Chief Complaint   Patient presents with    Gyn Exam     Positive pregnancy test at home LMP- 3/14/2025         Patsy Singh is a 40 year old female who presents for positive pregnancy test .    LMP: 3/14/25.    Menses regular: yes.    Menstrual flow normal: yes.    Birth control or HRT: no.   Refill no  Last Pap Smear: 2024 . Any history of abnormal paps: no   Gardasil:(age 9-44 y/o) no.   Any medication refills needed today?: no    Problems/concerns: none.      Next Appt:         Immunization History   Administered Date(s) Administered    TDAP 07/10/2023       Medications - Current[1]    Allergies[2]    OB History    Para Term  AB Living   3 2 2 0 0 2   SAB IAB Ectopic Multiple Live Births   0 0 0 0 2      # Outcome Date GA Lbr Yash/2nd Weight Sex Type Anes PTL Lv   3 Current            2 Term 09    F Vag-Spont   KENNEDY   1 Term 07    F Vag-Spont   KENNEDY       Gyn History  Hx Prior Abnormal Pap: No  Pap Date: 24  Pap Result Notes: normal   Menarche: 15 (2025 11:59 AM)  Period Cycle (Days): 28 (2025 11:59 AM)  Period Duration (Days): 4-7 (2025 11:59 AM)  Period Flow: moderate (2025 11:59 AM)  Use of Birth Control (if yes, specify type): None (2025 11:59 AM)  Date When Birth Control Last Used: IUD removed 2024 (2024 12:03 PM)  Hx Prior Abnormal Pap: No (2025 11:59 AM)  Pap Date: 24 (2025 11:59 AM)  Pap Result Notes: normal (2025 11:59 AM)        Latest Ref Rng & Units 2024    11:23 AM   RECENT PAP RESULTS   INTERPRETATION/RESULT:  NEGATIVE FOR INTRAEPITHELIAL LESION OR MALIGNANCY.    HPV Negative Negative            Past Medical History[3]    Past Surgical History[4]    Family History[5]     Tobacco  Allergies  Meds  Med Hx  Surg Hx  Fam Hx  Soc Hx        Social History     Socioeconomic History    Marital status:      Spouse name: Not on file    Number of children: Not on file    Years of education: Not on file     Highest education level: Not on file   Occupational History    Not on file   Tobacco Use    Smoking status: Some Days     Passive exposure: Never    Smokeless tobacco: Never    Tobacco comments:     vaping   Vaping Use    Vaping status: Every Day    Substances: Nicotine    Devices: Refillable tank   Substance and Sexual Activity    Alcohol use: Not Currently     Comment: rare    Drug use: Never    Sexual activity: Yes     Birth control/protection: I.U.D.     Comment: in a relationship   Other Topics Concern    Not on file   Social History Narrative    Not on file     Social Drivers of Health     Food Insecurity: Not on file   Transportation Needs: Not on file   Stress: Not on file   Housing Stability: Not on file        /79   Pulse 90   Ht 5' 2\" (1.575 m)   Wt 176 lb (79.8 kg)   LMP 03/15/2025 (Exact Date)     Wt Readings from Last 3 Encounters:   04/25/25 176 lb (79.8 kg)   02/17/25 173 lb (78.5 kg)   01/06/25 176 lb (79.8 kg)       Health Maintenance   Topic Date Due    COVID-19 Vaccine (1 - 2024-25 season) Never done    Influenza Vaccine (Season Ended) 10/01/2025    Annual Physical  01/06/2026    Pap Smear  05/02/2027    DTaP,Tdap,and Td Vaccines (2 - Td or Tdap) 07/10/2033    Annual Depression Screening  Completed    Tobacco Cessation Counseling  Completed    Pneumococcal Vaccine: Birth to 50yrs  Aged Out    Meningococcal B Vaccine  Aged Out         Review of Systems   General: Present- Feeling well. Not Present- Fever.  Female Genitourinary: Not Present- Dysmenorrhea, Dyspareunia, Flank Pain, Frequency, Menstrual Irregularities, Pelvic Pain, Urgency, Urinary Complaints, Vaginal Bleeding and Vaginal dryness.  Pain: Present- Pain Rating - 0 on a 0-10 scale.  All other systems negative       Physical Exam   The physical exam findings are as follows:     Abdomen   Inspection: - Inspection Normal.  Palpation/Percussion: Palpation and Percussion of the abdomen reveal - Non Tender, No hepatosplenomegaly and  No Palpable abdominal masses.    Female Genitourinary     External Genitalia   Perineum - Normal. Bartholin's Gland - Bilateral - Normal. Clitoris - Normal.  Introitus: Characteristics - Normal. Discharge - None.  Labia Majora: Lesions - Bilateral - None. Characteristics - Bilateral - Normal.  Labia Minora: Lesions - Bilateral - None. Characteristics - Bilateral - Normal.  Urethra: Characteristics - Normal. Discharge - None.  Island Gland - Bilateral - Normal.  Vulva: Characteristics - Normal. Lesions - None.    Speculum & Bimanual   Vagina:   Vaginal Wall: - Normal.  Vaginal Lesions - None. Vaginal Mucosa - Normal.  Cervix: Characteristics - No Motion tenderness. Discharge - None.  Uterus: Characteristics - Non Tender. Position - Midposition.  Adnexa: Characteristics - Bilateral - Tender. Masses - No Adnexal Masses.  Bladder - Normal.    Rectal   Anorectal Exam: External - normal external exam.    Lymphatic  General Lymphatics   Description - Normal .    Location: Transabdominal  Transvaginal x    OB Data: Fetus/Gestational Sac# 1/1      Placenta Location/Grade       EGA Dates 6wks      U/S EGA 6wks       MIKAELA adq               Impression: Early IUP.  F/u ultrasound in 2 weeks to confirm viability       1. Missed menses    2. History of 2 spontaneous abortions [Z87.59]                 [1]   Current Outpatient Medications:     Prenatal Vit-Fe Fumarate-FA (MULTI PRENATAL) 27-0.8 MG Oral Tab, Take by mouth daily., Disp: , Rfl:     nicotine 14 MG/24HR Transdermal Patch 24 Hr, Place 1 patch onto the skin daily. (Patient not taking: Reported on 4/25/2025), Disp: 30 each, Rfl: 0  [2] No Known Allergies  [3] History reviewed. No pertinent past medical history.  [4] History reviewed. No pertinent surgical history.  [5]   Family History  Problem Relation Age of Onset    Ovarian Cancer Maternal Grandmother     High Blood Pressure Father     Diabetes Father     High Cholesterol Father

## 2025-04-30 ENCOUNTER — TELEPHONE (OUTPATIENT)
Dept: OBGYN CLINIC | Facility: CLINIC | Age: 40
End: 2025-04-30

## 2025-04-30 RX ORDER — PROGESTERONE 200 MG/1
200 CAPSULE ORAL DAILY
Qty: 30 CAPSULE | Refills: 0 | Status: SHIPPED | OUTPATIENT
Start: 2025-04-30

## 2025-05-13 ENCOUNTER — LAB ENCOUNTER (OUTPATIENT)
Dept: LAB | Age: 40
End: 2025-05-13
Attending: OBSTETRICS & GYNECOLOGY
Payer: COMMERCIAL

## 2025-05-13 ENCOUNTER — INITIAL PRENATAL (OUTPATIENT)
Dept: OBGYN CLINIC | Facility: CLINIC | Age: 40
End: 2025-05-13

## 2025-05-13 VITALS
HEART RATE: 83 BPM | DIASTOLIC BLOOD PRESSURE: 83 MMHG | WEIGHT: 178 LBS | BODY MASS INDEX: 33 KG/M2 | SYSTOLIC BLOOD PRESSURE: 123 MMHG

## 2025-05-13 DIAGNOSIS — O09.521 AMA (ADVANCED MATERNAL AGE) MULTIGRAVIDA 35+, FIRST TRIMESTER (HCC): ICD-10-CM

## 2025-05-13 DIAGNOSIS — Z13.0 SCREENING, IRON DEFICIENCY ANEMIA: ICD-10-CM

## 2025-05-13 DIAGNOSIS — Z13.1 SCREENING FOR DIABETES MELLITUS (DM): ICD-10-CM

## 2025-05-13 DIAGNOSIS — O26.21: ICD-10-CM

## 2025-05-13 DIAGNOSIS — Z34.81 ENCOUNTER FOR SUPERVISION OF OTHER NORMAL PREGNANCY IN FIRST TRIMESTER (HCC): ICD-10-CM

## 2025-05-13 DIAGNOSIS — O36.8390 UNABLE TO HEAR FETAL HEART TONES AS REASON FOR ULTRASOUND SCAN (HCC): Primary | ICD-10-CM

## 2025-05-13 DIAGNOSIS — Z11.3 SCREEN FOR STD (SEXUALLY TRANSMITTED DISEASE): ICD-10-CM

## 2025-05-13 LAB
ANTIBODY SCREEN: NEGATIVE
BASOPHILS # BLD AUTO: 0.04 X10(3) UL (ref 0–0.2)
BASOPHILS NFR BLD AUTO: 0.3 %
DEPRECATED HBV CORE AB SER IA-ACNC: 116 NG/ML (ref 50–306)
EOSINOPHIL # BLD AUTO: 0.19 X10(3) UL (ref 0–0.7)
EOSINOPHIL NFR BLD AUTO: 1.3 %
ERYTHROCYTE [DISTWIDTH] IN BLOOD BY AUTOMATED COUNT: 12.3 %
EST. AVERAGE GLUCOSE BLD GHB EST-MCNC: 105 MG/DL (ref 68–126)
FASTING PATIENT GLUCOSE ANSWER: NO
GLUCOSE BLD-MCNC: 79 MG/DL (ref 70–99)
HBA1C MFR BLD: 5.3 % (ref ?–5.7)
HBV SURFACE AG SER-ACNC: <0.1 [IU]/L
HBV SURFACE AG SERPL QL IA: NONREACTIVE
HCT VFR BLD AUTO: 36.9 % (ref 35–48)
HCV AB SERPL QL IA: NONREACTIVE
HGB BLD-MCNC: 12.9 G/DL (ref 12–16)
IMM GRANULOCYTES # BLD AUTO: 0.06 X10(3) UL (ref 0–1)
IMM GRANULOCYTES NFR BLD: 0.4 %
LYMPHOCYTES # BLD AUTO: 1.88 X10(3) UL (ref 1–4)
LYMPHOCYTES NFR BLD AUTO: 13.2 %
MCH RBC QN AUTO: 29.7 PG (ref 26–34)
MCHC RBC AUTO-ENTMCNC: 35 G/DL (ref 31–37)
MCV RBC AUTO: 85 FL (ref 80–100)
MONOCYTES # BLD AUTO: 1.03 X10(3) UL (ref 0.1–1)
MONOCYTES NFR BLD AUTO: 7.2 %
NEUTROPHILS # BLD AUTO: 11.01 X10 (3) UL (ref 1.5–7.7)
NEUTROPHILS # BLD AUTO: 11.01 X10(3) UL (ref 1.5–7.7)
NEUTROPHILS NFR BLD AUTO: 77.6 %
PLATELET # BLD AUTO: 351 10(3)UL (ref 150–450)
RBC # BLD AUTO: 4.34 X10(6)UL (ref 3.8–5.3)
RH BLOOD TYPE: POSITIVE
RUBV IGG SER QL: POSITIVE
RUBV IGG SER-ACNC: 25 IU/ML (ref 10–?)
T PALLIDUM AB SER QL IA: NONREACTIVE
WBC # BLD AUTO: 14.2 X10(3) UL (ref 4–11)

## 2025-05-13 PROCEDURE — 86901 BLOOD TYPING SEROLOGIC RH(D): CPT

## 2025-05-13 PROCEDURE — 86762 RUBELLA ANTIBODY: CPT

## 2025-05-13 PROCEDURE — 87491 CHLMYD TRACH DNA AMP PROBE: CPT | Performed by: OBSTETRICS & GYNECOLOGY

## 2025-05-13 PROCEDURE — 87389 HIV-1 AG W/HIV-1&-2 AB AG IA: CPT

## 2025-05-13 PROCEDURE — 86850 RBC ANTIBODY SCREEN: CPT

## 2025-05-13 PROCEDURE — 87086 URINE CULTURE/COLONY COUNT: CPT | Performed by: OBSTETRICS & GYNECOLOGY

## 2025-05-13 PROCEDURE — 85025 COMPLETE CBC W/AUTO DIFF WBC: CPT

## 2025-05-13 PROCEDURE — 87801 DETECT AGNT MULT DNA AMPLI: CPT | Performed by: OBSTETRICS & GYNECOLOGY

## 2025-05-13 PROCEDURE — 81514 NFCT DS BV&VAGINITIS DNA ALG: CPT | Performed by: OBSTETRICS & GYNECOLOGY

## 2025-05-13 PROCEDURE — 87591 N.GONORRHOEAE DNA AMP PROB: CPT | Performed by: OBSTETRICS & GYNECOLOGY

## 2025-05-13 PROCEDURE — 82947 ASSAY GLUCOSE BLOOD QUANT: CPT

## 2025-05-13 PROCEDURE — 86780 TREPONEMA PALLIDUM: CPT

## 2025-05-13 PROCEDURE — 87340 HEPATITIS B SURFACE AG IA: CPT

## 2025-05-13 PROCEDURE — 82728 ASSAY OF FERRITIN: CPT

## 2025-05-13 PROCEDURE — 83036 HEMOGLOBIN GLYCOSYLATED A1C: CPT

## 2025-05-13 PROCEDURE — 36415 COLL VENOUS BLD VENIPUNCTURE: CPT

## 2025-05-13 PROCEDURE — 86900 BLOOD TYPING SEROLOGIC ABO: CPT

## 2025-05-13 PROCEDURE — 86803 HEPATITIS C AB TEST: CPT

## 2025-05-13 NOTE — PROGRESS NOTES
Patsy Singh is a 40 year old  female who presents for an initial OB exam.  Initial OB packet given and reviewed.    LMP: 3/13/2025 .     Positive preg test date:  home     Periods:  Every 28  days.    BMI: 32.56    Family Hx of Diabetes:  father side of mother and grandfather of father     Ethnic Background: white     Previous pregnancy complications: non - viable pregnancy back in november of last year     Present compliants:  nausea     Allergies; none      Diet, exercise, activity restrictions, course of care, first trimester and genetic and cystic fibrosis screening reviewed. Cord blood banking reviewed.    Location: Transabdominal  Transvaginal x    OB Data: Fetus/Gestational Sac# 1/1      Placenta Location/Grade       EGA Dates 8.5      U/S EGA 8.5      MIKAELA adq      EFW crl= 20.5mm      Uterus wnls Y/N y      Adnexa wnls Y/N y      Fetal Position vtx              BPP     Impression: Viable IUP.  Dates by u/s today .    Sharon Farris MD

## 2025-05-13 NOTE — PROGRESS NOTES
Patsy Singh is a 40 year old  female who presents for an initial OB exam.  Initial OB packet given and reviewed.    LMP: 3/13/2025 .     Positive preg test date:  home     Periods:  Every 28  days.    BMI: ***    Family Hx of Diabetes:  father side of mother and grandfather of father     Ethnic Background: white     Previous pregnancy complications: na .    Present compliants:  nausea .    @rev@

## 2025-05-14 LAB
BV BACTERIA DNA VAG QL NAA+PROBE: NEGATIVE
C GLABRATA DNA VAG QL NAA+PROBE: NEGATIVE
C KRUSEI DNA VAG QL NAA+PROBE: NEGATIVE
C TRACH DNA SPEC QL NAA+PROBE: NEGATIVE
CANDIDA DNA VAG QL NAA+PROBE: NEGATIVE
N GONORRHOEA DNA SPEC QL NAA+PROBE: NEGATIVE
T VAGINALIS DNA VAG QL NAA+PROBE: NEGATIVE

## 2025-05-22 ENCOUNTER — LAB ENCOUNTER (OUTPATIENT)
Dept: LAB | Age: 40
End: 2025-05-22
Attending: OBSTETRICS & GYNECOLOGY
Payer: COMMERCIAL

## 2025-05-22 DIAGNOSIS — Z13.79 GENETIC TESTING: ICD-10-CM

## 2025-05-22 DIAGNOSIS — Z13.79 GENETIC TESTING: Primary | ICD-10-CM

## 2025-06-16 ENCOUNTER — OFFICE VISIT (OUTPATIENT)
Dept: PERINATAL CARE | Facility: HOSPITAL | Age: 40
End: 2025-06-16
Attending: OBSTETRICS & GYNECOLOGY
Payer: COMMERCIAL

## 2025-06-16 VITALS
WEIGHT: 178 LBS | HEIGHT: 62 IN | BODY MASS INDEX: 32.76 KG/M2 | DIASTOLIC BLOOD PRESSURE: 78 MMHG | SYSTOLIC BLOOD PRESSURE: 115 MMHG | HEART RATE: 81 BPM

## 2025-06-16 DIAGNOSIS — O99.210 OBESITY AFFECTING PREGNANCY (HCC): Primary | ICD-10-CM

## 2025-06-16 DIAGNOSIS — O09.521 AMA (ADVANCED MATERNAL AGE) MULTIGRAVIDA 35+, FIRST TRIMESTER (HCC): ICD-10-CM

## 2025-06-16 DIAGNOSIS — O99.210 OBESITY AFFECTING PREGNANCY (HCC): ICD-10-CM

## 2025-06-16 DIAGNOSIS — Z72.0 VAPES NICOTINE CONTAINING SUBSTANCE: ICD-10-CM

## 2025-06-16 PROCEDURE — 76813 OB US NUCHAL MEAS 1 GEST: CPT | Performed by: OBSTETRICS & GYNECOLOGY

## 2025-06-16 RX ORDER — FERROUS SULFATE 325(65) MG
325 TABLET, DELAYED RELEASE (ENTERIC COATED) ORAL EVERY OTHER DAY
COMMUNITY

## 2025-06-16 RX ORDER — ASPIRIN 81 MG/1
81 TABLET, CHEWABLE ORAL DAILY
COMMUNITY

## 2025-06-16 NOTE — PROGRESS NOTES
Reason for Consult:   Dear Dr. Farris,    Thank you for requesting ultrasound evaluation and maternal fetal medicine consultation on Patsy Singh.  As you are aware she is a 40 year old female with a [place] pregnancy .  A maternal-fetal medicine consultation was requested secondary to  AMA.  Her prenatal records and labs were reviewed.    Review of History:     OB History:    OB History    Para Term  AB Living   5 2 2 0 2 2   SAB IAB Ectopic Multiple Live Births   2 0 0 0 2      # Outcome Date GA Lbr Yash/2nd Weight Sex Type Anes PTL Lv   5 Current            4 Term 09    F Vag-Spont   KENNEDY   3 Term 07    F Vag-Spont   KENNEDY   2 SAB            1 SAB                      Allergies:  Allergies[1]   Current Meds:  Current Medications[2]     HISTORY:  Past Medical History[3]   Past Surgical History[4]   Family History[5]   Short Social Hx on File[6]     NARRATIVE:     /78 (BP Location: Right arm, Patient Position: Sitting, Cuff Size: adult)   Pulse 81   Ht 5' 2\" (1.575 m)   Wt 178 lb (80.7 kg)   LMP 2025   BMI 32.56 kg/m²           Alert and Oriented.  No acute distress          Abdomen:  soft, nontender, no contractions noted.           extremities:  nontender, no edema          DISCUSSION  During her visit we discussed and reviewed the following issues:  ADVANCED MATERNAL AGE    Background  I reviewed with the patient that pregnancies in women of advanced maternal age (35 or older at delivery) are associated with elevated risks. Specifically, there is a higher rate of:  Fetal malformations  Preeclampsia  Gestational diabetes  Intrauterine fetal death    As a result, enhanced pregnancy surveillance is advised for these patients including a comprehensive ultrasound to assess for fetal malformations (at 20 weeks) and a third trimester ultrasound assessment for fetal growth (at 32 weeks). In addition, weekly NST's (initiating at 36 weeks gestation for women 35-39 years  and at 32 weeks gestation for women 40 years and older) are also advised. Routine obstetric care is more than adequate to assess for gestational diabetes and preeclampsia; hence, no further significant alterations in obstetric care are advised.    Medical Complications    Women 35 years of age or older can expect to experience two to three fold higher rates of hospitalization,  delivery, and pregnancy-related complications when compared to their younger counterparts.  The two most common medical problems complicating these  pregnanccies are hypertension and diabetes.   The incidence of preeclampsia in the general obstetric population is 3 to 4 percent; this increases to 5 to 10 percent in women over age 40 and is as high as 35 percent in women over age 50.   The incidence of gestational diabetes in the general obstetric population is 3 percent, rising to 7 to 12 percent in women over age 40 and 20 percent in women over age 50.  Women 35 years of age or older are more likely to be delivered by . The  delivery rate in the general obstetric population of the [place] is almost 30 percent, compared to almost 50 percent in women over age 40 to 45 and almost 80 percent in women age 50 to 63.          Fetal Death        A decision analysis tool using data from the [employer]  [employer] predicted a strategy of weekly antepartum testing and labor induction would lower the risk of unexplained fetal death in women 35 years of age or older. In this model, weekly testing starting at 36 weeks of gestation would drop the risk of fetal death from 5.2 to 1.3 per 1000 pregnancies. While a policy of antepartum testing in older women does increase the chance that a women will be induced (71 inductions per fetal death averted) and thereby increases her risk of having a  delivery, only 14 additional cesareans would need to be performed to avert one unexplained fetal death.  Hence,  weekly NST's are advised for women of advanced maternal age; testing should be initiated at 36 weeks for women 35-39 years and at 32 weeks for women 40 years and older.    Fetal Malformations    Cardiac malformations, clubfoot, and diaphragmatic hernia appear to occur with increased frequency in offspring of older women. These abnormalities are structural and unrelated to aneuploidy, thus they would not be detected by karyotype analysis.  For these reasons a complete, detailed ultrasound (level II) is advised even if the fetus has a normal karyotype.      Fetal Aneuploidy      Invasive Testing  I offered invasive genetic testing (amniocentesis, chorionic villus sampling) after reviewing the diagnostic accuracy of these tests as well as the procedure associated loss rate (1:500 for genetic amniocentesis).        We discussed  the increased risk of chromosomal abnormalities associated with advanced maternal age at age 40 year old. She understands that ultrasound exam cannot exclude potential genetic abnormalities.  Her estimated risk based on maternal age at amniocentesis with any chromosome abnormality is about 1:60  and with Down Syndrome is about 1: 110.   We also discussed the risks and benefits of having  genetic testing ([facility] and amniocentesis) performed.      Non-invasive Pregnancy Testing (NIPT)  I reviewed current non-invasive screening options. Currently non-invasive pregnancy testing (NIPT) offers the highest detection rate (with the lowest false positive rate) for the detection of fetal aneuploidy amongst high-risk patients. The limitations of detailed mid-trimester sonography was reviewed with the patient. First trimester screening and second trimester multiple-marker serum serum screening as alternative aneuploidy screening options were also reviewed. However, both of these tests are associated with lower detection and higher false positive rates.    She stated she had a low risk NIPT.    OB ULTRASOUND  REPORT   See imaging tab for complete ultrasound report     Fetal Heart Rate: Present 145 bpm  Fetal Presentation: Transverse Left  Amniotic fluid MVP: WNL  Placental Location: Posterior       FIRST TRIMESTER SCREENING:    The CRL is consistent with the gestational age.  The nuchal translucency measures  2.0   mm. This is within normal limits.  The nasal bone is present.  Fetus: arms and legs seen suboptimally. Fetal head/skull and abdomen appeared normal. Stomach and bladder seen.   Uterus and adnexa appeared normal        IMPRESSION:   1. IUP @  13w4d  2. Scan consistent with dates  3. No fetal structural abnormalities seen but limited by early gestational age  4.  AMA      RECOMMENDATIONS:   1.  Weekly NST at 36wks  2.  Growth US at 32wks  3.  Level 2 US at 20wks    Thank you for allowing me to participate in the care of your patient.  Please do not hesitate to call with any questions or concerns.     Total time spent   40  minutes this calendar day which includes preparing to see the patient including chart review, obtaining and/or reviewing additional medical history, performing a physical exam and evaluation, documenting clinical information in the electronic medical record, independently interpreting results, counseling the patient, communicating results to the patient/family/caregiver and coordinating care.    Ashish Willoughby D.O.  Maternal Fetal Medicine     Note to patient and family:  The 21st Century Cures Act makes medical notes available to patients in the interest of transparency.  However, please be advised that this is a medical document.  It is intended as a peer to peer communication.  It is written in medical language and may contain abbreviations or verbiage that are technical and unfamiliar.  It may appear blunt or direct.  Medical documents are intended to carry relevant information, facts as evident, and the clinical opinion of the practitioner.         [1] No Known Allergies  [2]   Current  Outpatient Medications   Medication Sig Dispense Refill    aspirin 81 MG Oral Chew Tab Chew 1 tablet (81 mg total) by mouth daily.      Choline Bitartrate (CHOLINE OR) Take by mouth daily.      ferrous sulfate 325 (65 FE) MG Oral Tab EC Take 1 tablet (325 mg total) by mouth every other day.      Vitamin D-Vitamin K (VITAMIN K2-VITAMIN D3 OR) Take by mouth daily. 4000 IU      Prenatal Vit-Fe Fumarate-FA (MULTI PRENATAL) 27-0.8 MG Oral Tab Take by mouth in the morning.      progesterone (PROMETRIUM) 200 MG Oral Cap Take 1 capsule (200 mg total) by mouth daily. 30 capsule 0   [3] No past medical history on file.  [4] No past surgical history on file.  [5]   Family History  Problem Relation Age of Onset    Ovarian Cancer Maternal Grandmother     High Blood Pressure Father     Diabetes Father     High Cholesterol Father    [6]   Social History  Socioeconomic History    Marital status:    Tobacco Use    Smoking status: Some Days     Passive exposure: Never    Smokeless tobacco: Never    Tobacco comments:     vaping   Vaping Use    Vaping status: Every Day    Substances: Nicotine    Devices: Refillable tank   Substance and Sexual Activity    Alcohol use: Not Currently     Comment: rare    Drug use: Never    Sexual activity: Yes     Birth control/protection: I.U.D.     Comment: in a relationship     Social Drivers of Health     Food Insecurity: No Food Insecurity (5/12/2025)    NCSS - Food Insecurity     Worried About Running Out of Food in the Last Year: No     Ran Out of Food in the Last Year: No   Transportation Needs: No Transportation Needs (5/12/2025)    NCSS - Transportation     Lack of Transportation: No   Stress: No Stress Concern Present (5/12/2025)    Stress     Feeling of Stress : No   Housing Stability: Not At Risk (5/12/2025)    NCSS - Housing/Utilities     Has Housing: Yes     Worried About Losing Housing: No     Unable to Get Utilities: No

## 2025-06-19 ENCOUNTER — ROUTINE PRENATAL (OUTPATIENT)
Dept: OBGYN CLINIC | Facility: CLINIC | Age: 40
End: 2025-06-19

## 2025-06-19 VITALS — SYSTOLIC BLOOD PRESSURE: 124 MMHG | DIASTOLIC BLOOD PRESSURE: 79 MMHG | WEIGHT: 182 LBS | BODY MASS INDEX: 33 KG/M2

## 2025-06-19 DIAGNOSIS — Z36.1 NEED FOR MATERNAL SERUM ALPHA-PROTEIN (MSAFP) SCREENING (HCC): ICD-10-CM

## 2025-06-19 DIAGNOSIS — O09.521 AMA (ADVANCED MATERNAL AGE) MULTIGRAVIDA 35+, FIRST TRIMESTER (HCC): Primary | ICD-10-CM

## 2025-06-19 NOTE — PROGRESS NOTES
AFP ordered today, U/S next visit.     1. IUP @  13w4d  2. Scan consistent with dates  3. No fetal structural abnormalities seen but limited by early gestational age  4.  AMA        RECOMMENDATIONS:   1.  Weekly NST at 36wks  2.  Growth US at 32wks  3.  Level 2 US at 20wks       Future Appointments   Date Time Provider Department Center   7/8/2025  5:00 PM Sharon Farris MD [facility]   8/12/2025  6:15 PM Sharon Farris MD [facility]   9/9/2025  6:30 PM Sharon Farris MD [facility]   10/14/2025  6:15 PM Sharon Farris MD [facility]   11/11/2025  6:15 PM Sharon Farris MD [facility]   11/25/2025  6:15 PM Sharon Farris MD [facility]   12/2/2025  6:15 PM Sharon Farris MD [facility]   12/9/2025  6:45 PM Sharon Farris MD [facility]

## 2025-07-07 ENCOUNTER — LAB ENCOUNTER (OUTPATIENT)
Dept: LAB | Age: 40
End: 2025-07-07
Attending: OBSTETRICS & GYNECOLOGY
Payer: COMMERCIAL

## 2025-07-07 DIAGNOSIS — Z36.1 NEED FOR MATERNAL SERUM ALPHA-PROTEIN (MSAFP) SCREENING (HCC): ICD-10-CM

## 2025-07-07 PROCEDURE — 36415 COLL VENOUS BLD VENIPUNCTURE: CPT

## 2025-07-07 PROCEDURE — 82105 ALPHA-FETOPROTEIN SERUM: CPT

## 2025-07-08 ENCOUNTER — ROUTINE PRENATAL (OUTPATIENT)
Dept: OBGYN CLINIC | Facility: CLINIC | Age: 40
End: 2025-07-08

## 2025-07-08 VITALS — SYSTOLIC BLOOD PRESSURE: 110 MMHG | WEIGHT: 184.5 LBS | DIASTOLIC BLOOD PRESSURE: 74 MMHG | BODY MASS INDEX: 34 KG/M2

## 2025-07-08 DIAGNOSIS — O09.521 AMA (ADVANCED MATERNAL AGE) MULTIGRAVIDA 35+, FIRST TRIMESTER (HCC): Primary | ICD-10-CM

## 2025-07-08 NOTE — PROGRESS NOTES
AFP today, U/S next visit.     Future Appointments   Date Time Provider Department Center   8/4/2025  8:00 AM [facility] FET[facility]   8/12/2025  6:15 PM Sharon Farris MD [facility]   9/9/2025  6:30 PM Sharon Farris MD [facility]   10/14/2025  6:15 PM Sharon Farris MD [facility]   11/11/2025  6:15 PM Sharon Farris MD [facility]   11/25/2025  6:15 PM Sharon Farris MD [facility]   12/2/2025  6:15 PM Sharon Farris MD [facility]   12/9/2025  6:45 PM Sharon Farris MD [facility]

## 2025-07-09 LAB
AFP MOM: 0.93
AFP VALUE: 29.5 NG/ML
GA ON COLL DATE: 16.6 WEEKS
INSULIN DEP AFP: NO
MAT AGE AT EDD: 38.8 YR
MULTIPLE GEST AFP: NO
OSBR RISK 1 IN AFP: NORMAL
WEIGHT AFP: 182 LBS

## 2025-08-04 ENCOUNTER — HOSPITAL ENCOUNTER (OUTPATIENT)
Dept: PERINATAL CARE | Facility: HOSPITAL | Age: 40
Discharge: HOME | End: 2025-08-04
Attending: OBSTETRICS & GYNECOLOGY

## 2025-08-04 ENCOUNTER — HOSPITAL ENCOUNTER (OUTPATIENT)
Dept: PERINATAL CARE | Facility: HOSPITAL | Age: 40
End: 2025-08-04
Attending: OBSTETRICS & GYNECOLOGY

## 2025-08-04 VITALS
HEART RATE: 98 BPM | DIASTOLIC BLOOD PRESSURE: 69 MMHG | BODY MASS INDEX: 35 KG/M2 | SYSTOLIC BLOOD PRESSURE: 117 MMHG | WEIGHT: 189 LBS

## 2025-08-04 DIAGNOSIS — E66.09 OTHER OBESITY DUE TO EXCESS CALORIES AFFECTING PREGNANCY IN SECOND TRIMESTER (HCC): ICD-10-CM

## 2025-08-04 DIAGNOSIS — E66.09 CLASS 1 OBESITY DUE TO EXCESS CALORIES WITHOUT SERIOUS COMORBIDITY WITH BODY MASS INDEX (BMI) OF 32.0 TO 32.9 IN ADULT: ICD-10-CM

## 2025-08-04 DIAGNOSIS — Z36.89 ENCOUNTER FOR FETAL ANATOMIC SURVEY (HCC): ICD-10-CM

## 2025-08-04 DIAGNOSIS — E66.811 CLASS 1 OBESITY DUE TO EXCESS CALORIES WITHOUT SERIOUS COMORBIDITY WITH BODY MASS INDEX (BMI) OF 32.0 TO 32.9 IN ADULT: ICD-10-CM

## 2025-08-04 DIAGNOSIS — O09.522 MULTIGRAVIDA OF ADVANCED MATERNAL AGE IN SECOND TRIMESTER (HCC): Primary | ICD-10-CM

## 2025-08-04 DIAGNOSIS — O99.212 OTHER OBESITY DUE TO EXCESS CALORIES AFFECTING PREGNANCY IN SECOND TRIMESTER (HCC): ICD-10-CM

## 2025-08-04 PROCEDURE — 76811 OB US DETAILED SNGL FETUS: CPT | Performed by: OBSTETRICS & GYNECOLOGY

## 2025-08-12 ENCOUNTER — ROUTINE PRENATAL (OUTPATIENT)
Dept: OBGYN CLINIC | Facility: CLINIC | Age: 40
End: 2025-08-12

## 2025-08-12 VITALS — SYSTOLIC BLOOD PRESSURE: 119 MMHG | DIASTOLIC BLOOD PRESSURE: 75 MMHG | WEIGHT: 192.88 LBS | BODY MASS INDEX: 35 KG/M2

## 2025-08-12 DIAGNOSIS — O09.522 AMA (ADVANCED MATERNAL AGE) MULTIGRAVIDA 35+, SECOND TRIMESTER (HCC): Primary | ICD-10-CM

## (undated) NOTE — LETTER
Date: 2/17/2025    Patient Name: Patsy Singh      To Whom it may concern:    This letter has been written at the patient's request. The above patient was seen at [facility] for treatment of a medical condition.    This patient needs work-place modification with a standing desk for her acute medical condition.     Sincerely,  Judy Mascorro MD